# Patient Record
Sex: FEMALE | Race: WHITE | Employment: UNEMPLOYED | URBAN - METROPOLITAN AREA
[De-identification: names, ages, dates, MRNs, and addresses within clinical notes are randomized per-mention and may not be internally consistent; named-entity substitution may affect disease eponyms.]

---

## 2017-01-01 ENCOUNTER — ANTI-COAG VISIT (OUTPATIENT)
Dept: CARDIOLOGY CLINIC | Age: 67
End: 2017-01-01

## 2017-01-01 ENCOUNTER — OFFICE VISIT (OUTPATIENT)
Dept: CARDIOLOGY CLINIC | Age: 67
End: 2017-01-01

## 2017-01-01 ENCOUNTER — TELEPHONE (OUTPATIENT)
Dept: CARDIOLOGY CLINIC | Age: 67
End: 2017-01-01

## 2017-01-01 VITALS
HEART RATE: 117 BPM | HEIGHT: 67 IN | DIASTOLIC BLOOD PRESSURE: 80 MMHG | WEIGHT: 232 LBS | BODY MASS INDEX: 36.41 KG/M2 | OXYGEN SATURATION: 97 % | SYSTOLIC BLOOD PRESSURE: 130 MMHG

## 2017-01-01 DIAGNOSIS — E87.6 HYPOKALEMIA: ICD-10-CM

## 2017-01-01 DIAGNOSIS — Z79.01 ANTICOAGULATED ON COUMADIN: ICD-10-CM

## 2017-01-01 DIAGNOSIS — I10 ESSENTIAL HYPERTENSION: ICD-10-CM

## 2017-01-01 DIAGNOSIS — Z98.890 S/P ABLATION OF ATRIAL FLUTTER: ICD-10-CM

## 2017-01-01 DIAGNOSIS — I48.0 PAROXYSMAL ATRIAL FIBRILLATION (HCC): ICD-10-CM

## 2017-01-01 DIAGNOSIS — E78.5 DYSLIPIDEMIA: ICD-10-CM

## 2017-01-01 DIAGNOSIS — Z79.01 LONG TERM CURRENT USE OF ANTICOAGULANT THERAPY: ICD-10-CM

## 2017-01-01 DIAGNOSIS — I48.19 PERSISTENT ATRIAL FIBRILLATION (HCC): ICD-10-CM

## 2017-01-01 DIAGNOSIS — Z95.2 S/P AVR (AORTIC VALVE REPLACEMENT): ICD-10-CM

## 2017-01-01 DIAGNOSIS — I25.10 CORONARY ARTERY DISEASE INVOLVING NATIVE CORONARY ARTERY OF NATIVE HEART WITHOUT ANGINA PECTORIS: ICD-10-CM

## 2017-01-01 DIAGNOSIS — I48.19 PERSISTENT ATRIAL FIBRILLATION (HCC): Primary | ICD-10-CM

## 2017-01-01 DIAGNOSIS — R06.02 SOB (SHORTNESS OF BREATH): ICD-10-CM

## 2017-01-01 DIAGNOSIS — R00.2 PALPITATION: Primary | ICD-10-CM

## 2017-01-01 DIAGNOSIS — I48.91 ATRIAL FIBRILLATION, UNSPECIFIED TYPE (HCC): Primary | ICD-10-CM

## 2017-01-01 DIAGNOSIS — Z86.79 S/P ABLATION OF ATRIAL FLUTTER: ICD-10-CM

## 2017-01-01 DIAGNOSIS — I44.7 LBBB (LEFT BUNDLE BRANCH BLOCK): ICD-10-CM

## 2017-01-01 LAB
INR BLD: 1.9
INR BLD: 2.6
INR BLD: 2.8
INR BLD: 3.2
INR BLD: 3.2
INR BLD: 3.5
INR BLD: 3.7
PT POC: 22.9 SECONDS
PT POC: 31.5 SECONDS
PT POC: 33 SECONDS
PT POC: 33.3 SEC
PT POC: 33.5 SECONDS
PT POC: 34 SEC
PT POC: 37.9 SEC
PT POC: 39 SECONDS
PT POC: 42 SEC
PT POC: 43.8 SEC
VALID INTERNAL CONTROL?: YES

## 2017-01-01 RX ORDER — METOPROLOL TARTRATE 100 MG/1
TABLET ORAL
Qty: 180 TAB | Refills: 3 | Status: SHIPPED | OUTPATIENT
Start: 2017-01-01

## 2017-01-01 RX ORDER — DIGOXIN 125 MCG
TABLET ORAL
Qty: 90 TAB | Refills: 3 | Status: SHIPPED | OUTPATIENT
Start: 2017-01-01

## 2017-01-01 RX ORDER — WARFARIN SODIUM 5 MG/1
TABLET ORAL
Qty: 100 TAB | Refills: 3 | Status: SHIPPED | OUTPATIENT
Start: 2017-01-01

## 2017-01-18 NOTE — PROGRESS NOTES
The INR is stable and therapeutic.    Continue Coumadin to 5 mg daily except 2.5mg on Tues, Thurs, and Sat  Recheck INR in 5 weeks

## 2017-02-22 NOTE — PROGRESS NOTES
The INR is stable and therapeutic.    Continue Coumadin to 5 mg daily except 2.5mg on Tues, Thurs, and Sat  Recheck INR in 4 weeks

## 2017-04-19 NOTE — PROGRESS NOTES
The INR is above the therapeutic range. Ask the patient about bleeding complications.   Please make the following adjustments to Coumadin dosing: Hold x 1 then continue Coumadin to 5 mg daily except 2.5mg on Tues, Thurs, and Sat  Repeat the INR in 3 weeks

## 2017-05-10 NOTE — PROGRESS NOTES
The INR is above the therapeutic range. Ask the patient about bleeding complications.   Please make the following adjustments to Coumadin dosing:Hold x 1 then continue Coumadin to 5 mg daily except 2.5mg on Tues, Thurs, and Sat  Repeat the INR in 2 weeks with appt

## 2017-05-24 NOTE — MR AVS SNAPSHOT
Visit Information Date & Time Provider Department Dept. Phone Encounter #  
 5/24/2017 11:00 AM Jessica Iraheta MD [facility] [id number] Follow-up Instructions Return in about 6 months (around 11/24/2017). Your Appointments 6/8/2017 10:40 AM  
ANTICOAG NURSE with Pt Inr Hv C[facility] ([address]) Appt Note: 2 week INR  
 [address] [place] [address]  
[phone] [address] [address]  
  
    
 11/21/2017 10:40 AM  
Follow Up with Jessica Iraheta MD  
[facility] ([address]) Appt Note: 6 month follow up [address] [place] [address]  
[phone] [address] [facility] [address] Upcoming Health Maintenance Date Due Hepatitis C Screening 1950 DTaP/Tdap/Td series (1 - Tdap) 2/16/1970 ZOSTER VACCINE AGE 60> 2/16/2009 GLAUCOMA SCREENING Q2Y 2/16/2014 OSTEOPOROSIS SCREENING (DEXA) 2/16/2014 MEDICARE YEARLY EXAM 2/16/2014 BREAST CANCER SCRN MAMMOGRAM 8/11/2016 Pneumococcal 65+ Low/Medium Risk (2 of 2 - PPSV23) 9/15/2016 COLONOSCOPY 8/1/2017 INFLUENZA AGE 9 TO ADULT 8/1/2017 Allergies as of 5/24/2017  Review Complete On: 5/24/2017 By: Lauren Bah NP Severity Noted Reaction Type Reactions Percocet [Oxycodone-acetaminophen]  04/18/2012    Nausea and Vomiting Current Immunizations  Reviewed on 8/11/2014 Name Date Influenza Vaccine 10/2/2014, 10/24/2013 11:27 AM, 1/10/2013 Influenza Vaccine Split 9/15/2011, 1/7/2011 Pneumococcal Conjugate (PCV-13) 8/31/2015 Pneumococcal Vaccine (Unspecified Type) 9/15/2011 Not reviewed this visit You Were Diagnosed With   
  
 Codes Comments Paroxysmal atrial fibrillation (HCC)    -  Primary ICD-10-CM: I48.0 ICD-9-CM: 427.31 S/P AVR (aortic valve replacement)     ICD-10-CM: J35.3 ICD-9-CM: V43.3 Dyslipidemia     ICD-10-CM: E78.5 ICD-9-CM: 272.4 Hypokalemia     ICD-10-CM: E87.6 ICD-9-CM: 276.8 LBBB (left bundle branch block)     ICD-10-CM: I44.7 ICD-9-CM: 426.3 Coronary artery disease involving native coronary artery of native heart without angina pectoris     ICD-10-CM: I25.10 ICD-9-CM: 414.01   
 S/P ablation of atrial flutter     ICD-10-CM: Z98.890, Z86.79 
ICD-9-CM: V45.89 Vitals Pulse Height(growth percentile) Weight(growth percentile) SpO2 BMI OB Status (!) 117 5' 7\" (1.702 m) 232 lb (105.2 kg) 97% 36.34 kg/m2 Postmenopausal  
 Smoking Status Former Smoker Vitals History BMI and BSA Data Body Mass Index Body Surface Area  
 36.34 kg/m 2 2.23 m 2 Preferred Pharmacy Pharmacy Name Phone [facility] [address] [facility], [address] [place] [address] [phone] Your Updated Medication List  
  
   
This list is accurate as of: 5/24/17 12:13 PM.  Always use your most recent med list.  
  
  
  
  
 digoxin 0.125 mg tablet Commonly known as:  LANOXIN  
take 1 tablet by mouth once daily  
  
 fexofenadine 180 mg tablet Commonly known as:  Charli Fried Take 1 Tab by mouth daily as needed (post nasal drainage). metoprolol tartrate 100 mg IR tablet Commonly known as:  LOPRESSOR  
take 1 tablet by mouth twice a day  
  
 pravastatin 10 mg tablet Commonly known as:  PRAVACHOL Take 1 Tab by mouth nightly. sertraline 100 mg tablet Commonly known as:  ZOLOFT Take 1.5 Tabs by mouth daily. triamterene-hydroCHLOROthiazide 50-25 mg per capsule Commonly known as:  Speedy Mantis Take 1 Cap by mouth daily. VITAMIN D3 2,000 unit Tab Generic drug:  cholecalciferol (vitamin D3) Take 1 Tab by mouth nightly. warfarin 5 mg tablet Commonly known as:  COUMADIN Take one tablet by mouth daily or as directed by our office. We Performed the Following AMB POC EKG ROUTINE W/ 12 LEADS, INTER & REP [62947 CPT(R)] Follow-up Instructions Return in about 6 months (around 11/24/2017). Introducing [facility]! [employer] introduces [employer] patient portal. Now you can access parts of your medical record, email your doctor's office, and request medication refills online. 1. In your internet browser, go to [email or web address] 2. Click on the First Time User? Click Here link in the Sign In box. You will see the New Member Sign Up page. 3. Enter your [employer] Access Code exactly as it appears below. You will not need to use this code after youve completed the sign-up process. If you do not sign up before the expiration date, you must request a new code. · [employer] Access Code: [email or web address] Expires: 8/22/2017 12:12 PM 
 
4. Enter the last four digits of your Social Security Number (xxxx) and Date of Birth (mm/dd/yyyy) as indicated and click Submit. You will be taken to the next sign-up page. 5. Create a [employer]ama ID. This will be your [employer] login ID and cannot be changed, so think of one that is secure and easy to remember. 6. Create a [employer] password. You can change your password at any time. 7. Enter your Password Reset Question and Answer. This can be used at a later time if you forget your password. 8. Enter your e-mail address. You will receive e-mail notification when new information is available in [address] 9. Click Sign Up. You can now view and download portions of your medical record. 10. Click the Download Summary menu link to download a portable copy of your medical information. If you have questions, please visit the Frequently Asked Questions section of the Lal[employer] website. Remember, [employer] is NOT to be used for urgent needs. For medical emergencies, dial 911. Now available from your iPhone and Android! Please provide this summary of care documentation to your next provider. Your primary care clinician is listed as Clearance Sallies. If you have any questions after today's visit, please call [phone].

## 2017-05-24 NOTE — PROGRESS NOTES
1. Have you been to the ER, urgent care clinic since your last visit? Hospitalized since your last visit? no  2. Have you seen or consulted any other health care providers outside of the [address] [place] [address] since your last visit? Include any pap smears or colon screening.  no

## 2017-05-24 NOTE — PROGRESS NOTES
The INR is below the therapeutic range but has been eating a green vegetable every day and is \"sick of it. \"  Please make the following adjustments to Coumadin dosing: Decrease Coumadin to 2.5 mg daily except 5mg on M-W-F  Repeat the INR in 2 weeks    Her INRs had been therapeutic until her mother passed away. Will decrease her dose so that she does not have to eat as many greens. Will recheck her INR in 2 weeks and make further adjustments as needed. Advised to remain consistent with diet.

## 2017-05-24 NOTE — PROGRESS NOTES
HISTORY OF PRESENT ILLNESS  Tiffany Lindo is a 75 y.o. female. HPI  She has been feeling reasonably well from a cardiac standpoint. She has had very rare palpitations. She denies prolonged palpitations. She has had no dizziness or syncope. She denies chest pain, resting dyspnea, orthopnea or PND. She does have occasional mild dyspnea on exertion. She has had no symptoms to indicate TIA or amaurosis fugax. She underwent a Holter monitor on 10/20/2016, which demonstrated baseline atrial fibrillation with controlled ventricular rate. Her average heart rate was 71 BPM with a minimum of 45 BPM and a maximum of 154 BPM.  There were some pauses (99 times) the longest of which was 2.5 seconds. I reviewed the Holter monitor tracing myself and there appeared to be occasional pauses, but not long enough to consider pacemaker implantation. She has history of aortic valve replacement for severe aortic stenosis in 2012. Her cardiac catheterization prior to the aortic valve replacement on 02/24/12 demonstrated mild diffuse atherosclerotic changes of the epicardial coronary arteries with no significant obstruction. She received a 21 mm Magna Mullins bioprosthesis in March of 2012. The mean gradient across the bioprosthetic valve by echocardiogram in September of 2014 was 21 mmHg and was essentially unchanged from 2012. She then developed atrial flutter and underwent atrial flutter ablation on 08/13/12 successfully. She developed atrial fibrillation at the time of the ablation and was cardioverted and subsequently underwent the flutter ablation successfully. She was in sinus rhythm in September of 2015 when she was last seen by Dr. Raúl Carrero. She has history of hypertension but no diabetes. She has dyslipidemia with metabolic syndrome with high triglycerides and low HDL. Review of Systems   Constitutional: Negative for malaise/fatigue and weight loss. HENT: Negative for hearing loss.     Eyes: Negative for blurred vision and double vision. Respiratory: Negative for shortness of breath. Cardiovascular: Positive for palpitations. Negative for chest pain, orthopnea, claudication, leg swelling and PND. Gastrointestinal: Negative for blood in stool, heartburn and melena. Genitourinary: Negative for dysuria, frequency, hematuria and urgency. Musculoskeletal: Negative for back pain and joint pain. Skin: Negative for itching and rash. Neurological: Negative for dizziness, loss of consciousness, weakness and headaches. Psychiatric/Behavioral: Negative for depression and memory loss. Physical Exam   Constitutional: She is oriented to person, place, and time. She appears well-developed and well-nourished. HENT:   Head: Normocephalic and atraumatic. Eyes: Conjunctivae are normal. Pupils are equal, round, and reactive to light. Neck: Normal range of motion. Neck supple. No JVD present. Cardiovascular: Normal rate, S1 normal and S2 normal.  An irregularly irregular rhythm present. No extrasystoles are present. PMI is not displaced. Exam reveals no gallop and no friction rub. Murmur heard. Harsh early systolic murmur is present with a grade of 1/6  at the upper right sternal border  Pulses:       Carotid pulses are 3+ on the right side, and 3+ on the left side. Pulmonary/Chest: Effort normal. She has no rales. Abdominal: Soft. There is no tenderness. Musculoskeletal: She exhibits no edema. Neurological: She is alert and oriented to person, place, and time. No cranial nerve deficit. Skin: Skin is warm and dry. Psychiatric: She has a normal mood and affect.  Her behavior is normal.     Visit Vitals    /80    Pulse (!) 117    Ht 5' 7\" (1.702 m)    Wt 105.2 kg (232 lb)    SpO2 97%    BMI 36.34 kg/m2       Past Medical History:   Diagnosis Date    AF (atrial fibrillation) (HCC)     postoperative    Anemia NEC 6-2007    Aortic stenosis 2/15/2012    severe by echo (Feb 2012), s/p AVR with 21 mm Magna Mullins bioprosthesis    Arthralgia 1/19/2012    BV (bacterial vaginosis) 5-2011    CAD (coronary artery disease), native coronary artery     minimal disease angiographically (Feb 2012)    Cardiac catheterization 03/13/2012    LM patent. Otherwise mild coronary artery disease. RA 3.  RV 22/5. PA 32/11. W 10.  CO/CI (NETTA) 5.8/2.7 (TD) 5.3/2.5.  Cardiac echocardiogram 09/24/2014    EF 60%. No WMA. Mild-mod LVH. Gr 2 DDfx. Marked LAE. Mild KATINA. Bioprosthetic  AV w/normal fx (mean grad 21 mmHg).  Cardiac Holter monitor, abnormal 10/20/2016    Atrial fibrillation w/controlled ventricular rate. Avg HR 71 bpm.  BBB. Freq single VEs, 10 paired, bigeminy & trigeminy. (99) pauses, max 2.5 secs.  Colon polyp 8-2007    Depression 4-30-01    GARCIA (dyspnea on exertion) 1/19/2012    Dyslipidemia 2-2002 6-2007  TSH 1.3    Gastritis 8-2007    by EGD    Hypertension     Hypokalemia 10/24/2013    Hypovitaminosis D 7/23/2012    LBBB (left bundle branch block)     Obesity (BMI 30-39. 9)     S/P AVR (aortic valve replacement)     21 mm Magna Mullins bioprosthesis (march 2012)    Ventricular arrhythmia     Holter (July 2013); 5% PVCs, 7 runs of 3 beats VT    Vitamin D deficiency 4-1-09 (27)    10-01-09 (31)       Social History     Social History    Marital status:      Spouse name: N/A    Number of children: N/A    Years of education: N/A     Occupational History    Not on file.      Social History Main Topics    Smoking status: Former Smoker     Packs/day: 0.50     Years: 20.00     Quit date: 1/1/1998    Smokeless tobacco: Never Used    Alcohol use No    Drug use: No    Sexual activity: Not on file     Other Topics Concern    Not on file     Social History Narrative       Family History   Problem Relation Age of Onset    Hypertension Mother     Hypertension Father     Heart Attack Father     Hypertension Brother     Other Brother recurrent polio       Past Surgical History:   Procedure Laterality Date    HX AFIB ABLATION  8/14/2012    HX AORTIC VALVE REPLACEMENT  3/20/212    HX HEART CATHETERIZATION  3/13/2012    HX HEART VALVE SURGERY  3/20/2012    s/p Aortic valve replacement    MS COLONOSCOPY FLX DX W/COLLJ SPEC WHEN PFRMD  8-2007    polyp, Dr Karma Dumont       Current Outpatient Prescriptions   Medication Sig Dispense Refill    metoprolol tartrate (LOPRESSOR) 100 mg IR tablet take 1 tablet by mouth twice a day 180 Tab 3    warfarin (COUMADIN) 5 mg tablet Take one tablet by mouth daily or as directed by our office. 100 Tab 3    triamterene-hydrochlorothiazide (DYAZIDE) 50-25 mg per capsule Take 1 Cap by mouth daily. 30 Cap 6    pravastatin (PRAVACHOL) 10 mg tablet Take 1 Tab by mouth nightly. 90 Tab 3    digoxin (LANOXIN) 0.125 mg tablet take 1 tablet by mouth once daily 90 Tab 3    sertraline (ZOLOFT) 100 mg tablet Take 1.5 Tabs by mouth daily. 135 Tab 1    fexofenadine (ALLEGRA) 180 mg tablet Take 1 Tab by mouth daily as needed (post nasal drainage). 30 Tab prn    cholecalciferol, vitamin D3, (VITAMIN D3) 2,000 unit Tab Take 1 Tab by mouth nightly. EKG: unchanged from previous tracings, atrial fibrillation, rate mildly fast, LBBB. ASSESSMENT and PLAN  Encounter Diagnoses   Name Primary?  Persistent atrial fibrillation (HCC) Yes    Status post aortic valve replacement with 21 mm Magna Mullins tissue valve March 2012     Dyslipidemia     Hypokalemia     LBBB (left bundle branch block)     Coronary artery disease involving native coronary artery of native heart without angina pectoris     S/P ablation of atrial flutter     Essential hypertension    She continues with the persistent atrial fibrillation and from which she has been essentially asymptomatic.   Her Holter monitor in October of 2016 demonstrated a reasonably controlled heart rate with the wide swing of ventricular rate from the 40s to 150s with occasional pauses - indicating some degree of paola-tachy arrhythmia. The pauses were not severe enough to warrant pacemaker implantation. Because of the bradycardia and pauses, I would not be able to increase her rate controlling medication for fast heart rate particularly in light of the fact that her average heart rate has been adequate. The patient was advised that her atrial fibrillation is different from the atrial flutter that she had the ablation for. She was given two options. The first option would be continued rate control and anticoagulation. The second option would be to consider atrial fibrillation ablation. The patient has history of intolerance to amiodarone, therefore, the option of antiarrhythmic treatment has been ruled out. After a long discussion, the patient opted to continue on the current medical treatment and she will think over the ablation for atrial fibrillation for the future. She was advised that for the chronic atrial fibrillation, a successful ablation would be less than 50% statistically. Her bioprosthetic aortic valve appears to be functioning adequately.

## 2017-07-05 NOTE — PROGRESS NOTES
The INR is stable and therapeutic.    Coumadin 5 mg daily except 2.5 mg on Tu-Th-Sat  Recheck INR in 4 weeks

## 2017-07-05 NOTE — TELEPHONE ENCOUNTER
Patient called requesting Digoxin however I do not see any Digoxin levels lab. Would you like to get these done?

## 2017-09-18 NOTE — PROGRESS NOTES
Verbal order and read back per Lorena Villela NP  The INR is above the therapeutic range. Ask the patient about bleeding complications. Please make the following adjustments to Coumadin dosing: Hold X 1 then continue Coumadin 5 mg daily except 2.5 mg on Tu-Th-Sat  Repeat the INR in 3 weeks.   Patient informed of instructions, read back and verbalized understanding Marcin Carreon LPN